# Patient Record
Sex: MALE | Race: WHITE | NOT HISPANIC OR LATINO | URBAN - METROPOLITAN AREA
[De-identification: names, ages, dates, MRNs, and addresses within clinical notes are randomized per-mention and may not be internally consistent; named-entity substitution may affect disease eponyms.]

---

## 2021-01-02 ENCOUNTER — HOSPITAL ENCOUNTER (EMERGENCY)
Facility: HOSPITAL | Age: 46
Discharge: HOME | End: 2021-01-02
Attending: EMERGENCY MEDICINE

## 2021-01-02 VITALS
RESPIRATION RATE: 18 BRPM | HEART RATE: 105 BPM | SYSTOLIC BLOOD PRESSURE: 144 MMHG | DIASTOLIC BLOOD PRESSURE: 86 MMHG | TEMPERATURE: 99 F | OXYGEN SATURATION: 98 %

## 2021-01-02 DIAGNOSIS — T40.601A OPIATE OVERDOSE, ACCIDENTAL OR UNINTENTIONAL, INITIAL ENCOUNTER: Primary | ICD-10-CM

## 2021-01-02 LAB
BUN SERPL-MCNC: 15 MG/DL (ref 6–30)
CHLORIDE SERPL-SCNC: 99 MMOL/L (ref 95–110)
CREAT SERPL-MCNC: 0.9 MG/DL (ref 0.5–1.4)
GLUCOSE SERPL-MCNC: 162 MG/DL (ref 70–110)
HCT VFR BLD CALC: 44 %PCV (ref 36–54)
POC IONIZED CALCIUM: 1.05 MMOL/L (ref 1.06–1.42)
POC TCO2 (MEASURED): 29 MMOL/L (ref 23–29)
POTASSIUM BLD-SCNC: 3.5 MMOL/L (ref 3.5–5.1)
SAMPLE: ABNORMAL
SODIUM BLD-SCNC: 138 MMOL/L (ref 136–145)

## 2021-01-02 PROCEDURE — 99285 EMERGENCY DEPT VISIT HI MDM: CPT | Mod: ,,, | Performed by: EMERGENCY MEDICINE

## 2021-01-02 PROCEDURE — 25000003 PHARM REV CODE 250: Performed by: EMERGENCY MEDICINE

## 2021-01-02 PROCEDURE — 99285 PR EMERGENCY DEPT VISIT,LEVEL V: ICD-10-PCS | Mod: ,,, | Performed by: EMERGENCY MEDICINE

## 2021-01-02 PROCEDURE — 99284 EMERGENCY DEPT VISIT MOD MDM: CPT

## 2021-01-02 RX ORDER — NALOXONE HYDROCHLORIDE 4 MG/.1ML
SPRAY NASAL
Qty: 1 EACH | Refills: 11 | Status: SHIPPED | OUTPATIENT
Start: 2021-01-02

## 2021-01-02 RX ADMIN — SODIUM CHLORIDE 500 ML: 0.9 INJECTION, SOLUTION INTRAVENOUS at 09:01

## 2022-05-24 ENCOUNTER — HOSPITAL ENCOUNTER (EMERGENCY)
Facility: HOSPITAL | Age: 47
Discharge: HOME | End: 2022-05-24
Attending: EMERGENCY MEDICINE
Payer: MEDICAID

## 2022-05-24 VITALS
HEART RATE: 96 BPM | OXYGEN SATURATION: 98 % | TEMPERATURE: 98 F | DIASTOLIC BLOOD PRESSURE: 91 MMHG | WEIGHT: 200 LBS | HEIGHT: 68 IN | SYSTOLIC BLOOD PRESSURE: 161 MMHG | BODY MASS INDEX: 30.31 KG/M2 | RESPIRATION RATE: 18 BRPM

## 2022-05-24 DIAGNOSIS — M25.531 RIGHT WRIST PAIN: ICD-10-CM

## 2022-05-24 DIAGNOSIS — S62.346A CLOSED NONDISPLACED FRACTURE OF BASE OF FIFTH METACARPAL BONE OF RIGHT HAND, INITIAL ENCOUNTER: Primary | ICD-10-CM

## 2022-05-24 PROCEDURE — 99284 EMERGENCY DEPT VISIT MOD MDM: CPT | Mod: 25,ER

## 2022-05-24 PROCEDURE — 29125 APPL SHORT ARM SPLINT STATIC: CPT | Mod: RT,ER

## 2022-05-24 PROCEDURE — 63600175 PHARM REV CODE 636 W HCPCS: Mod: ER | Performed by: EMERGENCY MEDICINE

## 2022-05-24 PROCEDURE — 96372 THER/PROPH/DIAG INJ SC/IM: CPT | Mod: 59 | Performed by: EMERGENCY MEDICINE

## 2022-05-24 RX ORDER — IBUPROFEN 400 MG/1
400 TABLET ORAL EVERY 6 HOURS PRN
Qty: 20 TABLET | Refills: 0 | Status: SHIPPED | OUTPATIENT
Start: 2022-05-24

## 2022-05-24 RX ORDER — ACETAMINOPHEN 500 MG
1000 TABLET ORAL EVERY 6 HOURS PRN
Qty: 60 TABLET | Refills: 0 | Status: SHIPPED | OUTPATIENT
Start: 2022-05-24

## 2022-05-24 RX ORDER — KETOROLAC TROMETHAMINE 30 MG/ML
30 INJECTION, SOLUTION INTRAMUSCULAR; INTRAVENOUS
Status: COMPLETED | OUTPATIENT
Start: 2022-05-24 | End: 2022-05-24

## 2022-05-24 RX ADMIN — KETOROLAC TROMETHAMINE 30 MG: 30 INJECTION, SOLUTION INTRAMUSCULAR at 11:05
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
## 2023-03-14 ENCOUNTER — HOSPITAL ENCOUNTER (EMERGENCY)
Facility: HOSPITAL | Age: 48
Discharge: HOME | End: 2023-03-14
Attending: EMERGENCY MEDICINE
Payer: MEDICAID

## 2023-03-14 VITALS
WEIGHT: 200 LBS | OXYGEN SATURATION: 99 % | TEMPERATURE: 97 F | RESPIRATION RATE: 18 BRPM | SYSTOLIC BLOOD PRESSURE: 170 MMHG | HEIGHT: 68 IN | BODY MASS INDEX: 30.31 KG/M2 | HEART RATE: 96 BPM | DIASTOLIC BLOOD PRESSURE: 97 MMHG

## 2023-03-14 DIAGNOSIS — K40.90 RIGHT INGUINAL HERNIA: ICD-10-CM

## 2023-03-14 DIAGNOSIS — Z87.898 HISTORY OF ABDOMINAL PAIN: Primary | ICD-10-CM

## 2023-03-14 LAB
ALBUMIN SERPL-MCNC: 3.8 G/DL (ref 3.3–5.5)
ALBUMIN SERPL-MCNC: 3.9 G/DL (ref 3.3–5.5)
ALP SERPL-CCNC: 100 U/L (ref 42–141)
ALP SERPL-CCNC: 115 U/L (ref 42–141)
BILIRUB SERPL-MCNC: 0.5 MG/DL (ref 0.2–1.6)
BILIRUB SERPL-MCNC: 0.5 MG/DL (ref 0.2–1.6)
BILIRUBIN, POC UA: NEGATIVE
BLOOD, POC UA: NEGATIVE
BUN SERPL-MCNC: 14 MG/DL (ref 7–22)
CALCIUM SERPL-MCNC: 9.8 MG/DL (ref 8–10.3)
CHLORIDE SERPL-SCNC: 103 MMOL/L (ref 98–108)
CLARITY, POC UA: CLEAR
COLOR, POC UA: YELLOW
CREAT SERPL-MCNC: 1.1 MG/DL (ref 0.6–1.2)
GLUCOSE SERPL-MCNC: 177 MG/DL (ref 73–118)
GLUCOSE, POC UA: ABNORMAL
KETONES, POC UA: NEGATIVE
LEUKOCYTE EST, POC UA: NEGATIVE
NITRITE, POC UA: NEGATIVE
PH UR STRIP: 5.5 [PH]
POC ALT (SGPT): 30 U/L (ref 10–47)
POC ALT (SGPT): 33 U/L (ref 10–47)
POC AMYLASE: 44 U/L (ref 14–97)
POC AST (SGOT): 28 U/L (ref 11–38)
POC AST (SGOT): 28 U/L (ref 11–38)
POC GGT: 32 U/L (ref 5–65)
POC TCO2: 29 MMOL/L (ref 18–33)
POTASSIUM BLD-SCNC: 4.6 MMOL/L (ref 3.6–5.1)
PROTEIN, POC UA: NEGATIVE
PROTEIN, POC: 7.5 G/DL (ref 6.4–8.1)
PROTEIN, POC: 7.8 G/DL (ref 6.4–8.1)
SODIUM BLD-SCNC: 143 MMOL/L (ref 128–145)
SPECIFIC GRAVITY, POC UA: >=1.03
UROBILINOGEN, POC UA: 0.2 E.U./DL

## 2023-03-14 PROCEDURE — 85025 COMPLETE CBC W/AUTO DIFF WBC: CPT | Mod: ER

## 2023-03-14 PROCEDURE — 81003 URINALYSIS AUTO W/O SCOPE: CPT | Mod: ER

## 2023-03-14 PROCEDURE — 99283 EMERGENCY DEPT VISIT LOW MDM: CPT | Mod: ER

## 2023-03-14 PROCEDURE — 82150 ASSAY OF AMYLASE: CPT | Mod: ER

## 2023-03-14 PROCEDURE — 80053 COMPREHEN METABOLIC PANEL: CPT | Mod: ER

## 2023-03-14 RX ORDER — NAPROXEN 500 MG/1
500 TABLET ORAL 2 TIMES DAILY WITH MEALS
Qty: 14 TABLET | Refills: 0 | Status: SHIPPED | OUTPATIENT
Start: 2023-03-14

## 2023-03-14 NOTE — ED PROVIDER NOTES
Encounter Date: 3/14/2023       History     Chief Complaint   Patient presents with    Inguinal Hernia     Possible inguinal hernia pt reports has been coming and going for the past 6 months. Reports pain is worsened which is why he came in to be evaluated today.      Patient is a 47-year-old male who presents for hernia pain; PMH cigarette use, history of opioid misuse.  Patient presented to ED for pain over lower right inguinal hernia.  By time of interview, he states pain has resolved.  He uses Tylenol at home for pain.  He denies fever, vomiting, diarrhea or constipation.  Denies urinary symptoms.  The patients available PMH, PSH, Social History, medications, allergies, and triage vital signs were reviewed just prior to their medical evaluation.  A ten point review of systems was completed and is negative except as documented above.  Patient denies any other acute medical complaint.    Please be advised this text was dictated with [employer] software and may contain errors due to translation.             Review of patient's allergies indicates:  No Known Allergies  No past medical history on file.  No past surgical history on file.  No family history on file.  Social History     Tobacco Use    Smoking status: Every Day     Packs/day: 0.50     Types: Cigarettes    Smokeless tobacco: Never   Substance Use Topics    Alcohol use: No    Drug use: No     Review of Systems   Constitutional:  Negative for chills and fever.   HENT:  Negative for sore throat.    Respiratory:  Negative for shortness of breath.    Cardiovascular:  Negative for chest pain.   Gastrointestinal:  Positive for abdominal pain (since resolved). Negative for nausea.   Genitourinary:  Negative for dysuria.   Musculoskeletal:  Negative for back pain.   Skin:  Negative for rash.   Neurological:  Negative for weakness.   Hematological:  Does not bruise/bleed easily.     Physical Exam     Initial Vitals [03/14/23 1325]   BP Pulse Resp Temp SpO2   (!)  170/97 96 18 97 °F (36.1 °C) 99 %      MAP       --         Physical Exam    Nursing note and vitals reviewed.  Constitutional: He appears well-developed and well-nourished. He is not diaphoretic. No distress.   HENT:   Head: Normocephalic and atraumatic.   Right Ear: External ear normal.   Left Ear: External ear normal.   Mouth/Throat: Oropharynx is clear and moist.   Eyes: Conjunctivae and EOM are normal. Pupils are equal, round, and reactive to light. No scleral icterus.   Cardiovascular:  Normal rate, regular rhythm and intact distal pulses.           Pulmonary/Chest: Breath sounds normal. No respiratory distress. He has no wheezes. He has no rhonchi. He has no rales.   Abdominal: Abdomen is soft. Bowel sounds are normal. There is no abdominal tenderness (small R direct inguinal hernia, soft, nonttp). There is no rebound and no guarding.   Musculoskeletal:         General: No edema. Normal range of motion.     Neurological: He is alert and oriented to person, place, and time.   Skin: Skin is warm and dry. Capillary refill takes less than 2 seconds. No rash noted.   Psychiatric: He has a normal mood and affect. His behavior is normal. Judgment and thought content normal.       ED Course   Procedures  Labs Reviewed   POCT URINALYSIS W/O SCOPE - Abnormal; Notable for the following components:       Result Value    Glucose, UA 1+ (*)     Spec Grav UA >=1.030 (*)     All other components within normal limits   POCT CMP - Abnormal; Notable for the following components:    POC Glucose 177 (*)     All other components within normal limits   POCT URINALYSIS W/O SCOPE   POCT CBC   POCT CMP   POCT LIVER PANEL   POCT LIVER PANEL          Imaging Results    None          Medications - No data to display  Medical Decision Making:   History:   Old Medical Records: I decided to obtain old medical records.  Old Records Summarized: records from clinic visits and records from previous admission(s).  Initial Assessment:   Episode  of pain over right direct inguinal hernia earlier today, since resolved, exam unremarkable, VSS, afebrile  Differential Diagnosis:   Direct inguinal hernia, intermittent abdominal pain, gas, bladder spasm  Physical exam and history taking lower clinical suspicion for incarcerated hernia, acute abdomen  Clinical Tests:   Lab Tests: Ordered and Reviewed  ED Management:  Exam is reassuring at this time.  Labs are largely unremarkable.  Recommended routine follow-up for hernia. Patient agreed to plan of care and voiced understanding. Discharged in stable condition with strict ED return precautions.    Chela Hernandez PA-C                          Clinical Impression:   Final diagnoses:  [Z87.898] History of abdominal pain (Primary)  [K40.90] Right inguinal hernia        ED Disposition Condition    Discharge Stable          ED Prescriptions       Medication Sig Dispense Start Date End Date Auth. Provider    naproxen (NAPROSYN) 500 MG tablet Take 1 tablet (500 mg total) by mouth 2 (two) times daily with meals. 14 tablet 3/14/2023 -- Chela Hernandez PA-C          Follow-up Information    None          Chela Hernandez PA-C  03/14/23 7212

## 2023-03-14 NOTE — FIRST PROVIDER EVALUATION
Emergency Department TeleTriage Encounter Note      CHIEF COMPLAINT    Chief Complaint   Patient presents with    Inguinal Hernia     Possible inguinal hernia pt reports has been coming and going for the past 6 months. Reports pain is worsened which is why he came in to be evaluated today.        VITAL SIGNS   Initial Vitals [03/14/23 1325]   BP Pulse Resp Temp SpO2   (!) 170/97 96 18 97 °F (36.1 °C) 99 %      MAP       --            ALLERGIES    Review of patient's allergies indicates:  No Known Allergies    PROVIDER TRIAGE NOTE  This is a teletriage evaluation of a 47 y.o. male presenting to the ED with c/o abdominal pain.  Pt states he has an inguinal hernia and feels this causing the pain.  Pt states pain worse today.  Last BM yesterday.     PE: VSS.  NAD noted.      Plan: labs    Limited physical exam via telehealth: The patient is awake, alert, answering questions appropriately and is not in respiratory distress. All ED beds are full at present; patient notified of this status.  Patient seen and medically screened by Nurse Practitioner via teletriage.      Initial orders will be placed and care will be transferred to an alternate provider when patient is roomed for a full evaluation. Any additional orders and the final disposition will be determined by that provider.         ORDERS  Labs Reviewed   POCT URINALYSIS W/O SCOPE   POCT CBC   POCT CMP   POCT LIVER PANEL       ED Orders (720h ago, onward)      Start Ordered     Status Ordering Provider    03/14/23 1359 03/14/23 1358  Vital signs  Every 2 hours         Ordered JANET SEARS    03/14/23 1359 03/14/23 1358  Diet NPO  Diet effective now         Ordered JANET SEARS    03/14/23 1359 03/14/23 1358  Insert peripheral IV  Once         Ordered JANET SEARS    03/14/23 1359 03/14/23 1358  POCT CBC  Once         Ordered JANET SEARS    03/14/23 1359 03/14/23 1358  POCT CMP  Once         Ordered JANET SEARS    03/14/23 1359 03/14/23 1358   POCT Liver Panel (amylase)  Once         Ordered JANET SEARS    03/14/23 1328 03/14/23 1327  POCT URINALYSIS W/O SCOPE  Once         Acknowledged MAYUR KERN              Virtual Visit Note: The provider triage portion of this emergency department evaluation and documentation was performed via [employer] a HIPAA-compliant telemedicine application, in concert with a tele-presenter in the room. A face to face patient evaluation with one of my colleagues will occur once the patient is placed in an emergency department room.      DISCLAIMER: This note was prepared with [employer] voice recognition transcription software. Garbled syntax, mangled pronouns, and other bizarre constructions may be attributed to that software system.

## 2024-06-17 ENCOUNTER — TELEPHONE (OUTPATIENT)
Dept: PULMONOLOGY | Facility: CLINIC | Age: 49
End: 2024-06-17
Payer: MEDICAID

## 2024-06-17 NOTE — TELEPHONE ENCOUNTER
----- Message from Marlon Stephens sent at 6/17/2024 10:40 AM CDT -----  Regarding: Pt Advice  Contact: [phone]  Debbie/wife calling regarding having trouble with Cpap mask and usage. Would like to discuss. Please call [phone]

## 2024-07-08 ENCOUNTER — HOSPITAL ENCOUNTER (EMERGENCY)
Facility: HOSPITAL | Age: 49
Discharge: HOME | End: 2024-07-08
Attending: EMERGENCY MEDICINE
Payer: MEDICAID

## 2024-07-08 VITALS
SYSTOLIC BLOOD PRESSURE: 143 MMHG | RESPIRATION RATE: 20 BRPM | HEART RATE: 97 BPM | TEMPERATURE: 98 F | WEIGHT: 200 LBS | OXYGEN SATURATION: 98 % | BODY MASS INDEX: 30.41 KG/M2 | DIASTOLIC BLOOD PRESSURE: 98 MMHG

## 2024-07-08 DIAGNOSIS — S99.921A FOOT INJURY, RIGHT, INITIAL ENCOUNTER: ICD-10-CM

## 2024-07-08 DIAGNOSIS — S93.601A SPRAIN OF RIGHT FOOT, INITIAL ENCOUNTER: Primary | ICD-10-CM

## 2024-07-08 PROCEDURE — 99284 EMERGENCY DEPT VISIT MOD MDM: CPT | Mod: 25,ER

## 2024-07-08 PROCEDURE — 25000003 PHARM REV CODE 250: Mod: ER | Performed by: EMERGENCY MEDICINE

## 2024-07-08 RX ORDER — KETOROLAC TROMETHAMINE 10 MG/1
10 TABLET, FILM COATED ORAL
Status: COMPLETED | OUTPATIENT
Start: 2024-07-08 | End: 2024-07-08

## 2024-07-08 RX ORDER — METHOCARBAMOL 500 MG/1
1000 TABLET, FILM COATED ORAL 3 TIMES DAILY
Qty: 30 TABLET | Refills: 0 | Status: SHIPPED | OUTPATIENT
Start: 2024-07-08 | End: 2024-07-13

## 2024-07-08 RX ORDER — KETOROLAC TROMETHAMINE 10 MG/1
10 TABLET, FILM COATED ORAL EVERY 6 HOURS
Qty: 20 TABLET | Refills: 0 | Status: SHIPPED | OUTPATIENT
Start: 2024-07-08 | End: 2024-07-13

## 2024-07-08 RX ORDER — ACETAMINOPHEN 500 MG
500 TABLET ORAL EVERY 6 HOURS PRN
Qty: 20 TABLET | Refills: 0 | Status: SHIPPED | OUTPATIENT
Start: 2024-07-08

## 2024-07-08 RX ADMIN — KETOROLAC TROMETHAMINE 10 MG: 10 TABLET, FILM COATED ORAL at 06:07

## 2024-07-08 NOTE — ED PROVIDER NOTES
"Encounter Date: 7/8/2024    SCRIBE #1 NOTE: I, Ban Tyler, am scribing for, and in the presence of,  Katherine Brown MD.       History     Chief Complaint   Patient presents with    Foot Injury     Pt reports he heard something crack in his right foot about an hour ago   +swelling     Romeo Pardo Jr. is a 48 y.o. male, with no pertinent past medical history, who presents to the ED with right foot pain that began a few hours PTA. Patient reports associated swelling to the dorsum of the right foot. Patient states he was walking when he stepped into a hole, twisting his right foot inwards. Patient states he heard a "crack". Patient denies attempting any at home treatment. Patient denies taking any daily medications. Patient admits to smoking tobacco (0.5 ppd), but denies EtOH or illicit drug usage. NKDA.       The history is provided by the patient. No  was used.     Review of patient's allergies indicates:  No Known Allergies  History reviewed. No pertinent past medical history.  Past Surgical History:   Procedure Laterality Date    HERNIA REPAIR       No family history on file.  Social History     Tobacco Use    Smoking status: Every Day     Current packs/day: 0.50     Types: Cigarettes    Smokeless tobacco: Never   Substance Use Topics    Alcohol use: No    Drug use: No     Review of Systems   Constitutional:  Negative for activity change, appetite change, chills and fever.   HENT:  Negative for congestion, rhinorrhea, sneezing and sore throat.    Respiratory:  Negative for cough, chest tightness, shortness of breath and wheezing.    Cardiovascular:  Negative for chest pain and palpitations.   Gastrointestinal:  Negative for abdominal pain, diarrhea, nausea and vomiting.   Musculoskeletal:         (+) right foot pain and swelling   Skin:  Negative for rash.   Neurological:  Negative for dizziness, light-headedness and headaches.   All other systems reviewed and are " negative.      Physical Exam     Initial Vitals [07/08/24 1712]   BP Pulse Resp Temp SpO2   (!) 143/98 97 20 98 °F (36.7 °C) 98 %      MAP       --         Physical Exam    Nursing note and vitals reviewed.  Constitutional: He appears well-developed and well-nourished. No distress.   HENT:   Head: Normocephalic and atraumatic.   Eyes: Conjunctivae are normal.   Neck:   Normal range of motion.  Cardiovascular:  Normal rate and regular rhythm.           No murmur heard.  Pulmonary/Chest: Breath sounds normal. No respiratory distress.   Abdominal: Bowel sounds are normal. He exhibits no distension. There is no abdominal tenderness.   Musculoskeletal:         General: No edema. Normal range of motion.      Cervical back: Normal range of motion.      Right foot: Swelling (to dorsum) and tenderness (to base of 5th metatarsal) present.      Comments: No bruising to right foot.      Neurological: He is alert and oriented to person, place, and time.   Skin: Skin is warm and dry. No rash noted.   Psychiatric: He has a normal mood and affect. His behavior is normal.         ED Course   Procedures  Labs Reviewed - No data to display       Imaging Results              X-Ray Foot Complete Right (Final result)  Result time 07/08/24 18:24:59      Final result by Aj Guerin MD (07/08/24 18:24:59)                   Impression:      Mild degenerative changes in the ankle and foot on the right with no fracture dislocation or soft tissue swelling.      Electronically signed by: Aj Guerin  Date:    07/08/2024  Time:    18:24               Narrative:    EXAMINATION:  XR ANKLE COMPLETE 3 VIEW RIGHT; XR FOOT COMPLETE 3 VIEW RIGHT    CLINICAL HISTORY:  Unspecified injury of right foot, initial encounter    TECHNIQUE:  AP, lateral, and oblique images of the right ankle were performed.  Three views of the right foot were also obtained.    COMPARISON:  None    FINDINGS:  Bones, joint spaces and soft tissues appear intact.  Talar  dome is smooth.  Subtalar joint unremarkable.  No soft tissue swelling.  Osteophyte at the insertion of the Achilles tendon on the calcaneal tubercle.    Within the foot, bony structures appear intact with no displaced fracture or bony destructive process.  No soft tissue swelling or foreign body.                                       X-Ray Ankle Complete Right (Final result)  Result time 07/08/24 18:24:59      Final result by Aj Guerin MD (07/08/24 18:24:59)                   Impression:      Mild degenerative changes in the ankle and foot on the right with no fracture dislocation or soft tissue swelling.      Electronically signed by: Aj Guerin  Date:    07/08/2024  Time:    18:24               Narrative:    EXAMINATION:  XR ANKLE COMPLETE 3 VIEW RIGHT; XR FOOT COMPLETE 3 VIEW RIGHT    CLINICAL HISTORY:  Unspecified injury of right foot, initial encounter    TECHNIQUE:  AP, lateral, and oblique images of the right ankle were performed.  Three views of the right foot were also obtained.    COMPARISON:  None    FINDINGS:  Bones, joint spaces and soft tissues appear intact.  Talar dome is smooth.  Subtalar joint unremarkable.  No soft tissue swelling.  Osteophyte at the insertion of the Achilles tendon on the calcaneal tubercle.    Within the foot, bony structures appear intact with no displaced fracture or bony destructive process.  No soft tissue swelling or foreign body.                                       Medications   ketorolac tablet 10 mg (10 mg Oral Given 7/8/24 1801)     Medical Decision Making  48M presents with right foot pain that began a few hours ago after twisting it inwards. On exam, patient has tenderness to the base of the 5th metatarsal and swelling to the dorsum of the foot. In shared decision making with the patient I will order right foot and ankle x-ray. Give PO Toradol for pain.    Amount and/or Complexity of Data Reviewed  Radiology: ordered. Decision-making details  documented in ED Course.    Risk  OTC drugs.  Prescription drug management.            Scribe Attestation:   Scribe #1: I performed the above scribed service and the documentation accurately describes the services I performed. I attest to the accuracy of the note.        ED Course as of 07/08/24 1857 Mon Jul 08, 2024   1804 Assumed care of patient pending x-ray of the right lower extremity to assess for any acute osseous abnormalities.  [AS]   1856 X-ray imaging with degenerative changes, no subluxation or acute fracture.  Suspect patient's symptoms secondary to sprain. Pt is currently stable for discharge. I see no indication of an emergent process beyond that addressed during our encounter but have duly counseled the patient/family regarding the need for prompt follow-up as well as the indications that should prompt immediate return to the emergency room should new or worrisome developments occur. I discussed the ED work up and diagnostic findings with the patient/family. The patient/family has been provided with verbal and printed direction regarding our final diagnosis(es) as well as instructions regarding use of OTC and/or Rx medications intended to manage the patient's aforementioned conditions. The patient/family verbalized an understanding. The patient/family is asked if there are any questions or concerns. We discuss the case, until all issues are addressed to the patient/family's satisfaction. Patient/family understands and is agreeable to the plan.  [AS]      ED Course User Index  [AS] Munira Boothe MD                         I, Dr. Katherine Brown, personally performed the services described in this documentation.   All medical record entries made by the scribe were at my direction and in my presence.   I have reviewed the chart and agree that the record is accurate and complete.   Katherine Brown MD.  5:39 PM 07/08/2024     Clinical Impression:  Final diagnoses:  [S99.921A] Foot injury, right, initial  encounter  [P33.260T] Sprain of right foot, initial encounter (Primary)          ED Disposition Condition    Discharge Stable          ED Prescriptions       Medication Sig Dispense Start Date End Date Auth. Provider    acetaminophen (TYLENOL) 500 MG tablet Take 1 tablet (500 mg total) by mouth every 6 (six) hours as needed for Pain. 20 tablet 7/8/2024 -- Munira Boothe MD    methocarbamoL (ROBAXIN) 500 MG Tab Take 2 tablets (1,000 mg total) by mouth 3 (three) times daily. for 5 days 30 tablet 7/8/2024 7/13/2024 Munira Boothe MD    ketorolac (TORADOL) 10 mg tablet Take 1 tablet (10 mg total) by mouth every 6 (six) hours. for 5 days 20 tablet 7/8/2024 7/13/2024 Munira Boothe MD          Follow-up Information       Follow up With Specialties Details Why Contact Info    Ele Perez MD Family Medicine Schedule an appointment as soon as possible for a visit  for reassesment [address]  [facility] [address]  [phone]      [facility] ED Emergency Medicine  If symptoms worsen [address] [facility] [id number] [phone]             Munira Boothe MD  07/08/24 4771

## 2024-07-08 NOTE — DISCHARGE INSTRUCTIONS
Thank you for coming to our Emergency Department today. It is important to remember that some problems are difficult to diagnose and may not be found during your first visit. Be sure to follow up with your primary care doctor and review any labs/imaging that was performed with them. If you do not have a primary care doctor, you may contact the one listed on your discharge paperwork or you may also call the [facility] Appointment Desk at [phone] to schedule an appointment with one.     All medications may potentially have side effects and it is impossible to predict which medications may give you side effects. If you feel that you are having a negative effect of any medication you should immediately stop taking them and seek medical attention.    Return to the ER with any questions/concerns, new/concerning symptoms, worsening or failure to improve. Do not drive or make any important decisions for 24 hours if you have received any pain medications, sedatives or mood altering drugs during your ER visit.]